# Patient Record
Sex: MALE
[De-identification: names, ages, dates, MRNs, and addresses within clinical notes are randomized per-mention and may not be internally consistent; named-entity substitution may affect disease eponyms.]

---

## 2017-12-23 ENCOUNTER — HOSPITAL ENCOUNTER (EMERGENCY)
Dept: HOSPITAL 31 - C.ER | Age: 42
Discharge: HOME | End: 2017-12-23
Payer: COMMERCIAL

## 2017-12-23 VITALS
DIASTOLIC BLOOD PRESSURE: 92 MMHG | HEART RATE: 88 BPM | RESPIRATION RATE: 16 BRPM | TEMPERATURE: 98.5 F | SYSTOLIC BLOOD PRESSURE: 148 MMHG | OXYGEN SATURATION: 97 %

## 2017-12-23 DIAGNOSIS — S61.213A: Primary | ICD-10-CM
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
## 2017-12-23 NOTE — C.PDOC
History Of Present Illness


41 yo male c/o laceration to the left 3rd finger just prior to arrival. Pt 

works at the [employer], was using a knife and cut his finger. Right hand dominant. 

No change in sensation. 


Time Seen by Provider: 12/23/17 05:40


Chief Complaint (Nursing): Abnormal Skin Integrity


History Per: Patient


History/Exam Limitations: no limitations


Onset/Duration Of Symptoms: Mins


Current Symptoms Are (Timing): Still Present





Past Medical History


Vital Signs: 


 Last Vital Signs











Temp  98.5 F   12/23/17 05:39


 


Pulse  88   12/23/17 05:39


 


Resp  16   12/23/17 05:39


 


BP  148/92 H  12/23/17 05:39


 


Pulse Ox  97   12/23/17 06:16











Family History: States: Unknown Family Hx





- Social History


Hx Alcohol Use: Yes


Hx Substance Use: No





- Immunization History


Hx Tetanus Toxoid Vaccination: Yes (2014)


Hx Influenza Vaccination: No


Hx Pneumococcal Vaccination: No





Review Of Systems


Constitutional: Negative for: Fever


Neurological: Negative for: Weakness, Numbness





Physical Exam





- Physical Exam


Appears: Well, Non-toxic, No Acute Distress


Skin: Warm, Dry, Other ((+) 3 cm laceration to the distal aspect of the left 

3rd finger )


Head: Atraumatic, Normacephalic


Eye(s): bilateral: Normal Inspection, EOMI


Nose: Normal


Oral Mucosa: Moist


Neck: Normal


Chest: Symmetrical


Respiratory: No Accessory Muscle Use


Extremity: Normal ROM, Capillary Refill (< 2 sec), No Swelling


Pulses: Left Radial: Normal, Right Radial: Normal


Neurological/Psych: Oriented x3, Normal Motor, Normal Sensation





ED Course And Treatment


O2 Sat by Pulse Oximetry: 97


Progress Note: Pt refused pain medication.  Discussed wound care and instructed 

to follow up with PMD in days for re-evaluation.





Laceration





- Laceration Repair


  ** left 3rd finger


Wound Length (In cm): 2


Description Of Wound: Irregular (C shaped)


Wound Cleansed With: Betadine, Sterile Saline


Anesthesia: Lidocaine 1%


Wound Examination: Irrigated With Saline, No FB With Wound Exploration, No 

Tendon Injury With Wound Exploration


Wound Closure: Suture (2)


Suture Technique And Material Used: Nylon (5-0)


Wound Complexity: Simple





Disposition





- Disposition


Disposition: HOME/ ROUTINE


Disposition Time: 05:47


Condition: STABLE


Additional Instructions: 


Wound check in 2 days. Suture removal in 7 days. Watch for signs of infection 

including redness, swelling and discharge. Return to ER if these should arise. 


Instructions:  Finger Laceration (ED)


Forms:  [facility] (English), Work Excuse





- Clinical Impression


Clinical Impression: 


 Finger laceration

## 2018-01-01 ENCOUNTER — HOSPITAL ENCOUNTER (EMERGENCY)
Dept: HOSPITAL 31 - C.ER | Age: 43
Discharge: HOME | End: 2018-01-01
Payer: COMMERCIAL

## 2018-01-01 VITALS
RESPIRATION RATE: 16 BRPM | DIASTOLIC BLOOD PRESSURE: 74 MMHG | HEART RATE: 72 BPM | TEMPERATURE: 98.6 F | SYSTOLIC BLOOD PRESSURE: 122 MMHG

## 2018-01-01 VITALS — OXYGEN SATURATION: 95 %

## 2018-01-01 DIAGNOSIS — Z48.02: Primary | ICD-10-CM

## 2018-01-01 NOTE — C.PDOC
History Of Present Illness


42-year-old male, presents to the emergency department for suture removal. 

Patient was seen in ED for lacerating repair on 12/23 to the left third finger. 

Denies any fevers, change in sensation, new injuries. 


Time Seen by Provider: 01/01/18 08:25


Chief Complaint (Nursing): Abnormal Skin Integrity


History Per: Patient


History/Exam Limitations: no limitations





Past Medical History


Reviewed: Historical Data, Nursing Documentation, Vital Signs


Vital Signs: 


 Last Vital Signs











Temp  98.6 F   01/01/18 09:28


 


Pulse  72   01/01/18 09:28


 


Resp  16   01/01/18 09:28


 


BP  122/74   01/01/18 09:28


 


Pulse Ox  95   01/01/18 10:07











Family History: States: No Known Family Hx





- Social History


Hx Alcohol Use: Yes


Hx Substance Use: No





- Immunization History


Hx Tetanus Toxoid Vaccination: Yes (2014)


Hx Influenza Vaccination: No


Hx Pneumococcal Vaccination: Yes





Review Of Systems


Constitutional: Negative for: Fever, Chills





Physical Exam





- Physical Exam


Appears: Non-toxic, No Acute Distress


Skin: Warm, Dry, No Rash


Head: Atraumatic, Normacephalic


Eye(s): bilateral: Normal Inspection, PERRL


Nose: Normal


Oral Mucosa: Moist


Lips: Normal Appearing


Neck: Normal ROM


Chest: Symmetrical


Respiratory: No Accessory Muscle Use


Extremity: Normal ROM, Other (two sutures to the left third digit, upper 

extremity)


Neurological/Psych: Oriented x3, Normal Speech





ED Course And Treatment


O2 Sat by Pulse Oximetry: 95





Medical Decision Making


Medical Decision Making: 


Suture Removal: two sutures from left hand third digit removed without 

difficulty.





Disposition





- Disposition


Disposition: HOME/ ROUTINE


Disposition Time: 09:19


Condition: GOOD


Additional Instructions: 


Return if worsened


Instructions:  Stitches Removal (ED)


Forms:  [facility] (English)





- Clinical Impression


Clinical Impression: 


 Visit for suture removal








- Scribe Statement


The provider has reviewed the documentation as recorded by the Scribe (Santos Sanchez)


All medical record entries made by the Scribe were at my direction and 

personally dictated by me. I have reviewed the chart and agree that the record 

accurately reflects my personal performance of the history, physical exam, 

medical decision making, and the department course for this patient. I have 

also personally directed, reviewed, and agree with the discharge instructions 

and disposition.